# Patient Record
Sex: FEMALE | Race: OTHER | HISPANIC OR LATINO | Employment: FULL TIME | URBAN - METROPOLITAN AREA
[De-identification: names, ages, dates, MRNs, and addresses within clinical notes are randomized per-mention and may not be internally consistent; named-entity substitution may affect disease eponyms.]

---

## 2023-09-10 ENCOUNTER — HOSPITAL ENCOUNTER (OUTPATIENT)
Facility: HOSPITAL | Age: 28
Discharge: HOME | End: 2023-09-11
Attending: EMERGENCY MEDICINE | Admitting: OBSTETRICS & GYNECOLOGY

## 2023-09-10 DIAGNOSIS — R10.9 ABDOMINAL PAIN DURING PREGNANCY IN FIRST TRIMESTER: Primary | ICD-10-CM

## 2023-09-10 DIAGNOSIS — O00.202 ECTOPIC PREGNANCY OF LEFT OVARY: ICD-10-CM

## 2023-09-10 DIAGNOSIS — O26.891 ABDOMINAL PAIN DURING PREGNANCY IN FIRST TRIMESTER: Primary | ICD-10-CM

## 2023-09-10 LAB
ALBUMIN SERPL-MCNC: 4.2 G/DL (ref 3.5–5)
ALBUMIN/GLOB SERPL: 1.1 RATIO (ref 1.1–2)
ALP SERPL-CCNC: 50 UNIT/L (ref 40–150)
ALT SERPL-CCNC: 12 UNIT/L (ref 0–55)
APPEARANCE UR: ABNORMAL
AST SERPL-CCNC: 15 UNIT/L (ref 5–34)
B-HCG FREE SERPL-ACNC: 2627.56 MIU/ML
BACTERIA #/AREA URNS AUTO: ABNORMAL /HPF
BASOPHILS # BLD AUTO: 0.02 X10(3)/MCL
BASOPHILS NFR BLD AUTO: 0.2 %
BILIRUB SERPL-MCNC: 0.5 MG/DL
BILIRUB UR QL STRIP.AUTO: NEGATIVE
BUN SERPL-MCNC: 8.2 MG/DL (ref 7–18.7)
CALCIUM SERPL-MCNC: 9.4 MG/DL (ref 8.4–10.2)
CHLORIDE SERPL-SCNC: 108 MMOL/L (ref 98–107)
CO2 SERPL-SCNC: 20 MMOL/L (ref 22–29)
COLOR UR: ABNORMAL
CREAT SERPL-MCNC: 0.64 MG/DL (ref 0.55–1.02)
EOSINOPHIL # BLD AUTO: 0.17 X10(3)/MCL (ref 0–0.9)
EOSINOPHIL NFR BLD AUTO: 2 %
ERYTHROCYTE [DISTWIDTH] IN BLOOD BY AUTOMATED COUNT: 12.9 % (ref 11.5–17)
GFR SERPLBLD CREATININE-BSD FMLA CKD-EPI: >60 MLS/MIN/1.73/M2
GLOBULIN SER-MCNC: 3.9 GM/DL (ref 2.4–3.5)
GLUCOSE SERPL-MCNC: 101 MG/DL (ref 74–100)
GLUCOSE UR QL STRIP.AUTO: NEGATIVE
GROUP & RH: NORMAL
HCT VFR BLD AUTO: 38.6 % (ref 37–47)
HGB BLD-MCNC: 13.3 G/DL (ref 12–16)
IMM GRANULOCYTES # BLD AUTO: 0.02 X10(3)/MCL (ref 0–0.04)
IMM GRANULOCYTES NFR BLD AUTO: 0.2 %
INDIRECT COOMBS GEL: NORMAL
KETONES UR QL STRIP.AUTO: NEGATIVE
LEUKOCYTE ESTERASE UR QL STRIP.AUTO: ABNORMAL
LIPASE SERPL-CCNC: 20 U/L
LYMPHOCYTES # BLD AUTO: 3.5 X10(3)/MCL (ref 0.6–4.6)
LYMPHOCYTES NFR BLD AUTO: 41.7 %
MCH RBC QN AUTO: 29.9 PG (ref 27–31)
MCHC RBC AUTO-ENTMCNC: 34.5 G/DL (ref 33–36)
MCV RBC AUTO: 86.7 FL (ref 80–94)
MONOCYTES # BLD AUTO: 0.78 X10(3)/MCL (ref 0.1–1.3)
MONOCYTES NFR BLD AUTO: 9.3 %
NEUTROPHILS # BLD AUTO: 3.9 X10(3)/MCL (ref 2.1–9.2)
NEUTROPHILS NFR BLD AUTO: 46.6 %
NITRITE UR QL STRIP.AUTO: NEGATIVE
NRBC BLD AUTO-RTO: 0 %
PH UR STRIP.AUTO: 7 [PH]
PLATELET # BLD AUTO: 303 X10(3)/MCL (ref 130–400)
PMV BLD AUTO: 10.3 FL (ref 7.4–10.4)
POTASSIUM SERPL-SCNC: 3.2 MMOL/L (ref 3.5–5.1)
PROT SERPL-MCNC: 8.1 GM/DL (ref 6.4–8.3)
PROT UR QL STRIP.AUTO: NEGATIVE
RBC # BLD AUTO: 4.45 X10(6)/MCL (ref 4.2–5.4)
RBC #/AREA URNS AUTO: ABNORMAL /HPF
RBC UR QL AUTO: ABNORMAL
SODIUM SERPL-SCNC: 140 MMOL/L (ref 136–145)
SP GR UR STRIP.AUTO: 1.01 (ref 1–1.03)
SPECIMEN OUTDATE: NORMAL
SQUAMOUS #/AREA URNS AUTO: ABNORMAL /HPF
UROBILINOGEN UR STRIP-ACNC: 0.2
WBC # SPEC AUTO: 8.39 X10(3)/MCL (ref 4.5–11.5)
WBC #/AREA URNS AUTO: ABNORMAL /HPF

## 2023-09-10 PROCEDURE — 85025 COMPLETE CBC W/AUTO DIFF WBC: CPT | Performed by: EMERGENCY MEDICINE

## 2023-09-10 PROCEDURE — 63600175 PHARM REV CODE 636 W HCPCS: Performed by: EMERGENCY MEDICINE

## 2023-09-10 PROCEDURE — 83690 ASSAY OF LIPASE: CPT | Performed by: EMERGENCY MEDICINE

## 2023-09-10 PROCEDURE — 87591 N.GONORRHOEAE DNA AMP PROB: CPT | Performed by: EMERGENCY MEDICINE

## 2023-09-10 PROCEDURE — 84702 CHORIONIC GONADOTROPIN TEST: CPT | Performed by: EMERGENCY MEDICINE

## 2023-09-10 PROCEDURE — 96375 TX/PRO/DX INJ NEW DRUG ADDON: CPT

## 2023-09-10 PROCEDURE — 87491 CHLMYD TRACH DNA AMP PROBE: CPT | Performed by: EMERGENCY MEDICINE

## 2023-09-10 PROCEDURE — 80053 COMPREHEN METABOLIC PANEL: CPT | Performed by: EMERGENCY MEDICINE

## 2023-09-10 PROCEDURE — 99285 EMERGENCY DEPT VISIT HI MDM: CPT | Mod: 25

## 2023-09-10 PROCEDURE — 96374 THER/PROPH/DIAG INJ IV PUSH: CPT

## 2023-09-10 PROCEDURE — 81001 URINALYSIS AUTO W/SCOPE: CPT | Performed by: EMERGENCY MEDICINE

## 2023-09-10 PROCEDURE — 86901 BLOOD TYPING SEROLOGIC RH(D): CPT | Performed by: EMERGENCY MEDICINE

## 2023-09-10 PROCEDURE — 25000003 PHARM REV CODE 250: Performed by: EMERGENCY MEDICINE

## 2023-09-10 PROCEDURE — 96361 HYDRATE IV INFUSION ADD-ON: CPT

## 2023-09-10 RX ORDER — ONDANSETRON 2 MG/ML
4 INJECTION INTRAMUSCULAR; INTRAVENOUS
Status: COMPLETED | OUTPATIENT
Start: 2023-09-10 | End: 2023-09-10

## 2023-09-10 RX ORDER — MORPHINE SULFATE 4 MG/ML
4 INJECTION, SOLUTION INTRAMUSCULAR; INTRAVENOUS
Status: COMPLETED | OUTPATIENT
Start: 2023-09-10 | End: 2023-09-10

## 2023-09-10 RX ORDER — SODIUM CHLORIDE 9 MG/ML
1000 INJECTION, SOLUTION INTRAVENOUS
Status: COMPLETED | OUTPATIENT
Start: 2023-09-10 | End: 2023-09-10

## 2023-09-10 RX ADMIN — ONDANSETRON HYDROCHLORIDE 4 MG: 2 SOLUTION INTRAMUSCULAR; INTRAVENOUS at 07:09

## 2023-09-10 RX ADMIN — MORPHINE SULFATE 4 MG: 4 INJECTION, SOLUTION INTRAMUSCULAR; INTRAVENOUS at 07:09

## 2023-09-10 RX ADMIN — SODIUM CHLORIDE 1000 ML: 9 INJECTION, SOLUTION INTRAVENOUS at 07:09

## 2023-09-10 NOTE — Clinical Note
Diagnosis: Abdominal pain during pregnancy in first trimester [[id number]]   Future Attending Provider: GAYLE ECHEVERRIA [[id number]]   Admitting Provider:: GAYLE ECHEVERRIA [[id number]]

## 2023-09-11 VITALS
DIASTOLIC BLOOD PRESSURE: 61 MMHG | TEMPERATURE: 99 F | HEART RATE: 57 BPM | HEIGHT: 63 IN | WEIGHT: 160 LBS | BODY MASS INDEX: 28.35 KG/M2 | SYSTOLIC BLOOD PRESSURE: 100 MMHG | OXYGEN SATURATION: 99 % | RESPIRATION RATE: 18 BRPM

## 2023-09-11 PROBLEM — O00.102 MISCARRIAGE OF LEFT TUBAL ECTOPIC PREGNANCY: Status: ACTIVE | Noted: 2023-09-11

## 2023-09-11 PROBLEM — O03.9 MISCARRIAGE OF LEFT TUBAL ECTOPIC PREGNANCY: Status: ACTIVE | Noted: 2023-09-11

## 2023-09-11 LAB
C TRACH DNA SPEC QL NAA+PROBE: NOT DETECTED
N GONORRHOEA DNA SPEC QL NAA+PROBE: NOT DETECTED
SOURCE (OHS): NORMAL

## 2023-09-11 PROCEDURE — 63600175 PHARM REV CODE 636 W HCPCS: Performed by: OBSTETRICS & GYNECOLOGY

## 2023-09-11 PROCEDURE — 96401 CHEMO ANTI-NEOPL SQ/IM: CPT | Performed by: OBSTETRICS & GYNECOLOGY

## 2023-09-11 PROCEDURE — G0378 HOSPITAL OBSERVATION PER HR: HCPCS

## 2023-09-11 RX ORDER — NAPROXEN 500 MG/1
500 TABLET ORAL EVERY 8 HOURS PRN
Status: DISCONTINUED | OUTPATIENT
Start: 2023-09-11 | End: 2023-09-11 | Stop reason: HOSPADM

## 2023-09-11 RX ORDER — ONDANSETRON 4 MG/1
8 TABLET, ORALLY DISINTEGRATING ORAL EVERY 8 HOURS PRN
Status: DISCONTINUED | OUTPATIENT
Start: 2023-09-11 | End: 2023-09-11 | Stop reason: HOSPADM

## 2023-09-11 RX ORDER — PROCHLORPERAZINE EDISYLATE 5 MG/ML
5 INJECTION INTRAMUSCULAR; INTRAVENOUS EVERY 6 HOURS PRN
Status: DISCONTINUED | OUTPATIENT
Start: 2023-09-11 | End: 2023-09-11 | Stop reason: HOSPADM

## 2023-09-11 RX ORDER — OXYCODONE AND ACETAMINOPHEN 5; 325 MG/1; MG/1
1 TABLET ORAL EVERY 4 HOURS PRN
Status: DISCONTINUED | OUTPATIENT
Start: 2023-09-11 | End: 2023-09-11 | Stop reason: HOSPADM

## 2023-09-11 RX ORDER — METHOTREXATE 25 MG/ML
50 INJECTION INTRA-ARTERIAL; INTRAMUSCULAR; INTRATHECAL; INTRAVENOUS ONCE
Status: COMPLETED | OUTPATIENT
Start: 2023-09-11 | End: 2023-09-11

## 2023-09-11 RX ORDER — NAPROXEN 500 MG/1
500 TABLET ORAL EVERY 8 HOURS PRN
Qty: 30 TABLET | Refills: 0 | Status: SHIPPED | OUTPATIENT
Start: 2023-09-11

## 2023-09-11 RX ADMIN — METHOTREXATE 90 MG: 25 SOLUTION INTRA-ARTERIAL; INTRAMUSCULAR; INTRATHECAL; INTRAVENOUS at 03:09

## 2023-09-11 NOTE — CONSULTS
Received consult regarding assistance with Medicaid application, spoke with Dorinda with [facility] who reports that patient is on her list and she will be in patient's rm shortly to assist with application. Updated GILMA Silvestre.

## 2023-09-11 NOTE — DISCHARGE SUMMARY
[facility] - 2nd Floor Mother/Baby Unit  Discharge Summary  Gynecology      Admit Date: 9/10/2023    Discharge Date and Time:  09/11/2023 1:16 PM    Attending Physician: Romario Peterson MD     Discharge Provider: Renata Gomes    Reason for Admission: Suspected ectopic pregnancy    Procedures Performed: * No surgery found *    Hospital Course (synopsis of major diagnoses, care, treatment, and services provided during the course of the hospital stay): 27 yo G [id number] who presented to the ED with acute LLQ pain. Beta-hcg found to be 2,627.56. TVUS revealed no IUP with peripheral echogenic left adnexal lesion concerning for ectopic pregnancy. Patient was counseled on options and received methotrexate for medical management. She was seen by social work for assistance in setting up financial coverage to be seen for follow up in [facility]. She was found to be meeting milestones and stable for discharge on HD#2.     Consults: social work    Significant Diagnostic Studies: Radiology: Ultrasound: No intrauterine pregnancy identified.  A peripherally echogenic left adnexal lesion measures up to 22 mm and could be early ectopic pregnancy.    Final Diagnoses:   Principal Problem: Left adnexal ectopic pregnancy    Discharged Condition: good    Disposition: Home or Self Care    Follow Up/Patient Instructions:     Medications:  Reconciled Home Medications:      Medication List        START taking these medications      naproxen 500 MG tablet  Commonly known as: NAPROSYN  Take 1 tablet (500 mg total) by mouth every 8 (eight) hours as needed (cramping/pain scale 1-3).            Discharge Procedure Orders   HCG, Quantitative   Standing Status: Future Standing Exp. Date: 11/09/24     Order Specific Question Answer Comments   Release to patient Immediate      HCG, Quantitative   Standing Status: Future Standing Exp. Date: 11/09/24   Order Comments:       Order Specific Question Answer Comments   Release to patient  Immediate      Diet Adult Regular     Notify your health care provider if you experience any of the following:  temperature >100.4     Notify your health care provider if you experience any of the following:  persistent nausea and vomiting or diarrhea     Notify your health care provider if you experience any of the following:  severe uncontrolled pain     Notify your health care provider if you experience any of the following:  difficulty breathing or increased cough     Notify your health care provider if you experience any of the following:   Order Comments: Mucho sangrado vaginal.     Activity as tolerated      Follow-up Information       [facility] - ED Follow up on 9/14/2023.    Why: Follow up on day 4 - 9/14/23 @ [facility] 2nd floor outpatient labs.   Follow up on day 7- 9/17/23 @ [facility] ER entrance.     For follow up on lab work after methotrexate administration.  Contact information:  [address] [facility] [id number]                         Renata Gomes DO  [facility] PGY-2

## 2023-09-11 NOTE — SUBJECTIVE & OBJECTIVE
OB History    Para Term  AB Living   3 2 0 0 0 0   SAB IAB Ectopic Multiple Live Births   0 0 0 0 0      # Outcome Date GA Lbr Anupam/2nd Weight Sex Delivery Anes PTL Lv   3             2 Para            1 Para              History reviewed. No pertinent past medical history.  History reviewed. No pertinent surgical history.    (Not in a hospital admission)      Review of patient's allergies indicates:  No Known Allergies     Family History    None       Tobacco Use    Smoking status: Not on file    Smokeless tobacco: Not on file   Substance and Sexual Activity    Alcohol use: Not on file    Drug use: Not on file    Sexual activity: Not on file     Review of Systems   Constitutional: Negative.    Respiratory: Negative.     Cardiovascular: Negative.    Gastrointestinal: Negative.    Endocrine: Negative.    Genitourinary: Negative.    Musculoskeletal: Negative.    Neurological: Negative.    Hematological: Negative.    Psychiatric/Behavioral: Negative.     Breast: negative.       Objective:     Vital Signs (Most Recent):  Temp: 98.4 °F (36.9 °C) (09/10/23 234)  Pulse: 82 (09/10/23 234)  Resp: 16 (09/10/23 2344)  BP: 113/73 (09/10/23 234)  SpO2: 99 % (09/10/23 2344) Vital Signs (24h Range):  Temp:  [98.2 °F (36.8 °C)-98.4 °F (36.9 °C)] 98.4 °F (36.9 °C)  Pulse:  [] 82  Resp:  [16-22] 16  SpO2:  [99 %-100 %] 99 %  BP: (113-150)/(59-82) 113/73     Weight: 72.6 kg (160 lb)  Body mass index is 28.34 kg/m².    No LMP recorded.     Physical Exam:   Constitutional: She is oriented to person, place, and time. She appears well-developed and well-nourished. No distress.    HENT:   Head: Normocephalic and atraumatic.     Neck: No thyromegaly present.     Pulmonary/Chest: Effort normal. No respiratory distress.        Abdominal: Soft. She exhibits no distension and no mass. There is abdominal tenderness (LLQ). There is guarding (LLQ). There is no rebound.             Musculoskeletal: Normal range of  "motion and moves all extremeties. No tenderness.       Neurological: She is alert and oriented to person, place, and time. No cranial nerve deficit. Coordination normal.    Skin: She is not diaphoretic.    Psychiatric: She has a normal mood and affect. Her behavior is normal. Judgment and thought content normal.        Laboratory:  Beta HCG: No results for input(s): "HCGPREGUR" in the last 48 hours.  CBC:   Recent Labs   Lab 09/10/23  1933   WBC 8.39   RBC 4.45   HGB 13.3   HCT 38.6      MCV 86.7   MCH 29.9   MCHC 34.5     CMP:   Recent Labs   Lab 09/10/23  1933   CALCIUM 9.4   ALBUMIN 4.2      K 3.2*   CO2 20*   BUN 8.2   CREATININE 0.64   ALKPHOS 50   ALT 12   AST 15   BILITOT 0.5       Diagnostic Results:  Labs: Reviewed  US: Reviewed  "

## 2023-09-11 NOTE — HPI
28 yr  with LMP approx 5-6 wks ago presents to the ER with acute LLQ pain.  Pt denies any health probs.  Prior pregnancies were normal vaginal deliveries with no prenatal complications.    History reviewed. No pertinent past medical history.    History reviewed. No pertinent surgical history.    Review of patient's allergies indicates:  No Known Allergies

## 2023-09-11 NOTE — NURSING
Discharge instructions reviewed with patient at bedside with  line. Order sheet for lab work given for 9/15 and 9/18.Instructed patient to go to [facility] for blood draws.

## 2023-09-11 NOTE — ED PROVIDER NOTES
Encounter Date: 9/10/2023    SCRIBE #1 NOTE: I, Tuan Hernandez, am scribing for, and in the presence of,  Yudelka Mar MD. I have scribed the following portions of the note - Other sections scribed: HPI,ROS,PE.       History     Chief Complaint   Patient presents with    Abdominal Pain     States she is about 2wks pregnant and began to have vaginal bleeding today and severe lower abd pain.      29 y/o 2 week pregnant female with no PMHx presents to ED via EMS as a transfer from [facility] for lower abdominal pain onset 9/10. Pt reports the pain started in the morning and had a lot of spotting.  She denies any current spotting.     The history is provided by the patient. The history is limited by a language barrier. A  was used.     Review of patient's allergies indicates:  No Known Allergies  History reviewed. No pertinent past medical history.  History reviewed. No pertinent surgical history.  No family history on file.     Review of Systems   Constitutional:  Negative for fatigue, fever and unexpected weight change.   HENT:  Negative for congestion and rhinorrhea.    Eyes:  Negative for pain.   Respiratory:  Negative for chest tightness, shortness of breath and wheezing.    Cardiovascular:  Negative for chest pain.   Gastrointestinal:  Positive for abdominal pain (lower). Negative for constipation, diarrhea, nausea and vomiting.   Genitourinary:  Positive for vaginal bleeding. Negative for dysuria.   Musculoskeletal:  Negative for back pain and neck pain.   Skin:  Negative for rash.   Allergic/Immunologic: Negative for environmental allergies, food allergies and immunocompromised state.   Neurological:  Negative for dizziness and speech difficulty.   Hematological:  Does not bruise/bleed easily.   Psychiatric/Behavioral:  Negative for sleep disturbance and suicidal ideas.        Physical Exam     Initial Vitals [09/10/23 1920]   BP Pulse Resp Temp SpO2   (!) 150/82 106 (!) 22 98.3 °F (36.8 °C)  100 %      MAP       --         Physical Exam    Nursing note and vitals reviewed.  Constitutional: She appears well-developed and well-nourished. No distress.   HENT:   Head: Normocephalic and atraumatic.   Nose: Nose normal.   Mouth/Throat: Oropharynx is clear and moist.   Eyes: Conjunctivae and EOM are normal. Pupils are equal, round, and reactive to light.   Neck: Trachea normal. Neck supple.   Normal range of motion.  Cardiovascular:  Normal rate and regular rhythm.           No murmur heard.  Pulmonary/Chest: Breath sounds normal. No respiratory distress. She exhibits no tenderness.   Abdominal: Abdomen is soft. Bowel sounds are normal. There is abdominal tenderness (lower abdominal tenderness). There is guarding.   Musculoskeletal:         General: Normal range of motion.      Cervical back: Normal range of motion and neck supple.      Lumbar back: Normal. Normal range of motion.     Neurological: She is alert and oriented to person, place, and time. She has normal strength. No cranial nerve deficit or sensory deficit.   Skin: Skin is warm and dry. Capillary refill takes less than 2 seconds. No rash and no abscess noted. No erythema. No pallor.   Psychiatric: She has a normal mood and affect. Her behavior is normal. Judgment and thought content normal.         ED Course   Procedures  Labs Reviewed   COMPREHENSIVE METABOLIC PANEL - Abnormal; Notable for the following components:       Result Value    Potassium Level 3.2 (*)     Chloride 108 (*)     Carbon Dioxide 20 (*)     Glucose Level 101 (*)     Globulin 3.9 (*)     All other components within normal limits   URINALYSIS, REFLEX TO URINE CULTURE - Abnormal; Notable for the following components:    Appearance, UA SL CLOUDY (*)     Blood, UA Moderate (*)     Leukocyte Esterase, UA Trace (*)     All other components within normal limits   HCG, QUANTITATIVE - Abnormal; Notable for the following components:    Beta Human Chorionic Gonadotropin Quantitative  2,627.56 (*)     All other components within normal limits   URINALYSIS, MICROSCOPIC - Abnormal; Notable for the following components:    Bacteria, UA Few (*)     Squamous Epithelial Cells, UA Few (*)     All other components within normal limits   LIPASE - Normal   CHLAMYDIA/GONORRHOEAE(GC), PCR   CBC W/ AUTO DIFFERENTIAL    Narrative:     The following orders were created for panel order CBC W/ AUTO DIFFERENTIAL.  Procedure                               Abnormality         Status                     ---------                               -----------         ------                     CBC with Differential[[id number]]                           Final result                 Please view results for these tests on the individual orders.   CBC WITH DIFFERENTIAL   TYPE & SCREEN          Imaging Results              US OB <14 Wks TransAbd & TransVag, Single Gestation (XPD) (Preliminary result)  Result time 09/10/23 22:17:02      Preliminary result by Carlos Fraser MD (09/10/23 22:17:02)                   Narrative:    START OF REPORT:  Technique: First trimester ultrasound of the pelvis was performed with transabdominal and transvaginal images being obtained.    Comparison: None.    Clinical history: LMP 2023. Beta HGG of 2627.56 MIU/ML. Abdominal pain and bleeding in pregnancy. .    FINDINGS:  Uterus: The uterus measures 9.3 cm in sagittal dimension by 6.5 cm in transverse dimension by 5.3 cm in AP dimension. The endometrium is thickened and measures 1.4 cm in thickness. There is a trace amount of fluid in the endometrium.  Intrauterine gestation: No intrauterine gestation is identified.    Adnexa:  Right Ovary: The right ovary measures 1.6 cm by 1.4 cm by 1.0 cm.  Left Ovary: The left ovary measures 2.6 cm by 1.9 cm by 2.1 cm. There is an apparent mass adjacent to the left ovary measuring 2.2 x 1.6 cm with surrounding free fluid and echogenic material. The possibility of an ectopic pregnancy is  entertained.    Notification: The results were discussed with the emergency room physician (Dr De La Cruz) prior to dictation at 2023-09-10 22:54:29 CDT.      Impression:  1. No intrauterine gestation is identified.  2. There is an apparent mass adjacent to the left ovary measuring 2.2 x 1.6 cm with surrounding free fluid and echogenic material. The possibility of an ectopic pregnancy is entertained. Correlate with clinical findings as regards further evaluation and follow-up.  3. Recommend short term serial clinical laboratory and ultrasound followup.                                         Medications   methotrexate (PF) injection 90 mg (has no administration in time range)   ondansetron disintegrating tablet 8 mg (has no administration in time range)   prochlorperazine injection Soln 5 mg (has no administration in time range)   oxyCODONE-acetaminophen 5-325 mg per tablet 1 tablet (has no administration in time range)   naproxen tablet 500 mg (has no administration in time range)   morphine injection 4 mg (4 mg Intravenous Given 9/10/23 1953)   ondansetron injection 4 mg (4 mg Intravenous Given 9/10/23 1952)   0.9%  NaCl infusion (0 mLs Intravenous Stopped 9/10/23 2100)             Scribe Attestation:   Scribe #1: I performed the above scribed service and the documentation accurately describes the services I performed. I attest to the accuracy of the note.  Comments: Attending:   Physician Attestation Statement for Scribe #1: Yudelka ESTEVEZ MD, personally performed the services described in this documentation. All medical record entries made by the scribe were at my direction and in my presence.  I have reviewed the chart and agree that the record reflects my personal performance and is accurate and complete.        Attending Attestation:           Physician Attestation for Scribe:  Physician Attestation Statement for Scribe #1: Zaid ESTEVEZ Brooke R, MD, reviewed documentation, as scribed by Tuan Hernandez in my  presence, and it is both accurate and complete.         Medical Decision Making  The differential diagnosis includes, but is not limited to, ruptured ectopic. Contained ectopic  Discussed case with obgyn hospitalist who took over care of patient and recommended methotrexate therapy, will need f/u labs in 4 days per his report     Problems Addressed:  Abdominal pain during pregnancy in first trimester: acute illness or injury that poses a threat to life or bodily functions  Ectopic pregnancy of left ovary: acute illness or injury that poses a threat to life or bodily functions    Amount and/or Complexity of Data Reviewed  External Data Reviewed: labs, radiology and notes.  Labs:  Decision-making details documented in ED Course.     Details: Reviewed labs from sending facility  Radiology:  Decision-making details documented in ED Course.     Details: Reviewed us from sending facility    Risk  OTC drugs.  Prescription drug management.  Decision regarding hospitalization.           ED Course as of 09/11/23 0110   Sun Sep 10, 2023   2157 Notified by [email or web address], suspicious for ectopic pregnancy with mass in LLQ, no IUP, and some small free fluid on TV US. Awaiting to get images sent over to review.  [GM]   2216 Attemping to call [facility] to confirm. However both numbers [phone] as well as [phone] appear to be non functional.  [GM]   2231 Patient informed of results. Pain mild currently . , /80 systolic, oxy sats 100% [GM]   2247 Spoke to the OB on call, Dr. Arias. Recommends ED to ED transfer.   Spoke with Dr. Manjarrez. Accepted to Located within [facility] ED. [GM]   9219 Paged OBGYN [JESU]   2304 Call and consult with OBGYN  [JESU]   1925 Spoke with dr lovelace will come down to see pt [BS]      ED Course User Index  [BS] Yudelka Mar MD  [GM] Zainab De La Cruz MD  [JESU] Tuan Hernandez               Medical Decision Making:   History:   Old Medical Records: I decided to obtain old medical records.  Old Records  Summarized: records from another hospital.  Initial Assessment:   See hpi  Clinical Tests:   Lab Tests: Reviewed  Radiological Study: Reviewed  Other:   I have discussed this case with another health care provider.      Clinical Impression:   Final diagnoses:  [O26.891, R10.9] Abdominal pain during pregnancy in first trimester (Primary)  [O00.202] Ectopic pregnancy of left ovary        ED Disposition Condition    Send to KRISTEN&Yudelka Baker MD  09/11/23 0110

## 2023-09-11 NOTE — H&P
[facility] - Emergency Dept  Obstetrics & Gynecology  History & Physical    Patient Name: Svetlana Akhtar  MRN: [id number]  Admission Date: 9/10/2023  Primary Care Provider: No primary care provider on file.    Subjective:     Chief Complaint/Reason for Admission:   Ectopic pregnancy in L adnexa    History of Present Illness:  28 yr  with LMP approx 5-6 wks ago presents to the ER with acute LLQ pain.  Pt denies any health probs.  Prior pregnancies were normal vaginal deliveries with no prenatal complications.    History reviewed. No pertinent past medical history.    History reviewed. No pertinent surgical history.    Review of patient's allergies indicates:  No Known Allergies            OB History    Para Term  AB Living   3 2 0 0 0 0   SAB IAB Ectopic Multiple Live Births   0 0 0 0 0      # Outcome Date GA Lbr Anupam/2nd Weight Sex Delivery Anes PTL Lv   3             2 Para            1 Para              History reviewed. No pertinent past medical history.  History reviewed. No pertinent surgical history.    (Not in a hospital admission)      Review of patient's allergies indicates:  No Known Allergies     Family History    None       Tobacco Use    Smoking status: Not on file    Smokeless tobacco: Not on file   Substance and Sexual Activity    Alcohol use: Not on file    Drug use: Not on file    Sexual activity: Not on file     Review of Systems   Constitutional: Negative.    Respiratory: Negative.     Cardiovascular: Negative.    Gastrointestinal: Negative.    Endocrine: Negative.    Genitourinary: Negative.    Musculoskeletal: Negative.    Neurological: Negative.    Hematological: Negative.    Psychiatric/Behavioral: Negative.     Breast: negative.       Objective:     Vital Signs (Most Recent):  Temp: 98.4 °F (36.9 °C) (09/10/23 2344)  Pulse: 82 (09/10/23 2344)  Resp: 16 (09/10/23 2344)  BP: 113/73 (09/10/23 2344)  SpO2: 99 % (09/10/23 2344) Vital Signs (24h  "Range):  Temp:  [98.2 °F (36.8 °C)-98.4 °F (36.9 °C)] 98.4 °F (36.9 °C)  Pulse:  [] 82  Resp:  [16-22] 16  SpO2:  [99 %-100 %] 99 %  BP: (113-150)/(59-82) 113/73     Weight: 72.6 kg (160 lb)  Body mass index is 28.34 kg/m².    No LMP recorded.     Physical Exam:   Constitutional: She is oriented to person, place, and time. She appears well-developed and well-nourished. No distress.    HENT:   Head: Normocephalic and atraumatic.     Neck: No thyromegaly present.     Pulmonary/Chest: Effort normal. No respiratory distress.        Abdominal: Soft. She exhibits no distension and no mass. There is abdominal tenderness (LLQ). There is guarding (LLQ). There is no rebound.             Musculoskeletal: Normal range of motion and moves all extremeties. No tenderness.       Neurological: She is alert and oriented to person, place, and time. No cranial nerve deficit. Coordination normal.    Skin: She is not diaphoretic.    Psychiatric: She has a normal mood and affect. Her behavior is normal. Judgment and thought content normal.        Laboratory:  Beta HCG: No results for input(s): "HCGPREGUR" in the last 48 hours.  CBC:   Recent Labs   Lab 09/10/23  1933   WBC 8.39   RBC 4.45   HGB 13.3   HCT 38.6      MCV 86.7   MCH 29.9   MCHC 34.5     CMP:   Recent Labs   Lab 09/10/23  1933   CALCIUM 9.4   ALBUMIN 4.2      K 3.2*   CO2 20*   BUN 8.2   CREATININE 0.64   ALKPHOS 50   ALT 12   AST 15   BILITOT 0.5       Diagnostic Results:  Labs: Reviewed  US: Reviewed    Assessment/Plan:     Obstetric  * Miscarriage of left tubal ectopic pregnancy  Discussed medical management with MTX vs immediate salpingectomy.  Language line interpretor used.  Pt states that she would be able to do the follow up required for medical management.  Consents signed, and all questions answered.  MTX 50 mg/m^2 x 1 dose.  Repeat HCT in 4 days and 7 days.  F/u [facility] clinic next week.  Ectopic rupture precautions given and pt will f/u " immediately in ER if pain worsens        Romario Peterson MD  Obstetrics & Gynecology  [facility] - Emergency Dept

## 2023-09-11 NOTE — HOSPITAL COURSE
HCG quant = 2627.  Pelvic sono suggestive of ectopic in L adnexa.  Small amount of free fluid was noted.  A+  Hgb: 13.3    Pelvic sono:  Uterus: The uterus measures 9.3 cm in sagittal dimension by 6.5 cm in transverse dimension by 5.3 cm in AP dimension. The endometrium is thickened and measures 1.4 cm in thickness. There is a trace amount of fluid in the endometrium.  Intrauterine gestation: No intrauterine gestation is identified.     Adnexa:  Right Ovary: The right ovary measures 1.6 cm by 1.4 cm by 1.0 cm.  Left Ovary: The left ovary measures 2.6 cm by 1.9 cm by 2.1 cm. There is an apparent mass adjacent to the left ovary measuring 2.2 x 1.6 cm with surrounding free fluid and echogenic material. The possibility of an ectopic pregnancy is entertained.     Notification: The results were discussed with the emergency room physician (Dr De La Cruz) prior to dictation at 2023-09-10 22:54:29 CDT.        Impression:  1. No intrauterine gestation is identified.  2. There is an apparent mass adjacent to the left ovary measuring 2.2 x 1.6 cm with surrounding free fluid and echogenic material. The possibility of an ectopic pregnancy is entertained. Correlate with clinical findings as regards further evaluation and follow-up.  3. Recommend short term serial clinical laboratory and ultrasound followup

## 2023-09-11 NOTE — ASSESSMENT & PLAN NOTE
Discussed medical management with MTX vs immediate salpingectomy.  Language line interpretor used.  Pt states that she would be able to do the follow up required for medical management.  Consents signed, and all questions answered.  MTX 50 mg/m^2 x 1 dose.  Repeat HCT in 4 days and 7 days.  F/u [facility] clinic next week.  Ectopic rupture precautions given and pt will f/u immediately in ER if pain worsens

## 2023-09-11 NOTE — ED PROVIDER NOTES
Encounter Date: 9/10/2023       History     Chief Complaint   Patient presents with    Abdominal Pain     States she is about 2wks pregnant and began to have vaginal bleeding today and severe lower abd pain.      Patient is a 28  F presenting with left lower abdominal and supra pubic pain that started this morning. She reports she recently had a positive pregnancy test but thinks she is only about 4-6 weeks along with last LMP in July. She also reports a large amount of bleeding this morning but now she is only having spotting. Last night she was in her normal state of health. No fevers, chills, chest pain, urinary symptoms, or abdominal pain then. Currently pain is severe.      line used.         Review of patient's allergies indicates:  No Known Allergies  History reviewed. No pertinent past medical history.  History reviewed. No pertinent surgical history.  No family history on file.     Review of Systems   Constitutional:  Negative for fever.   HENT:  Negative for sore throat.    Respiratory:  Negative for shortness of breath.    Cardiovascular:  Negative for chest pain.   Gastrointestinal:  Positive for abdominal pain. Negative for nausea.   Genitourinary:  Positive for vaginal bleeding. Negative for dysuria.   Musculoskeletal:  Negative for back pain.   Skin:  Negative for rash.   Neurological:  Negative for weakness.   Hematological:  Does not bruise/bleed easily.       Physical Exam     Initial Vitals [09/10/23 1920]   BP Pulse Resp Temp SpO2   (!) 150/82 106 (!) 22 98.3 °F (36.8 °C) 100 %      MAP       --         Physical Exam    Nursing note and vitals reviewed.  Constitutional: She appears well-developed and well-nourished. No distress.   HENT:   Head: Normocephalic and atraumatic.   Eyes: Conjunctivae are normal.   Neck: Neck supple.   Cardiovascular:  Normal rate, regular rhythm and normal heart sounds.           No murmur heard.  Pulmonary/Chest: Breath sounds normal. No respiratory  distress. She has no wheezes. She has no rhonchi.   Abdominal: Abdomen is soft. Bowel sounds are normal. She exhibits no distension. There is abdominal tenderness.   Diffusely tender to palpation of the abdomen with some mild voluntary guarding. There is guarding. There is no rebound.   Musculoskeletal:         General: No edema. Normal range of motion.      Cervical back: Neck supple.     Neurological: She is alert and oriented to person, place, and time.   Skin: Skin is warm and dry.   Psychiatric: She has a normal mood and affect. Thought content normal.         ED Course   ED US FAST    Date/Time: 9/10/2023 7:34 PM    Performed by: Zainab De La Cruz MD  Authorized by: Zainab De La Cruz MD    Indication:  Other (Abdominal pain, tachycardia, with suspected pregnancy)  Identified Structures: Morrisons, splenorenal recess, and suprapubic/culdesac were visualized.  The following findings in the peritoneal, pericardial, and pleural spaces were obtained:     Hepatorenal free fluid:  Absent    Splenorenal free fluid:  Absent    Suprapubic/Pouch of Ismael free fluid:  Absent    Impression:  No pathologic free fluid    Labs Reviewed   COMPREHENSIVE METABOLIC PANEL - Abnormal; Notable for the following components:       Result Value    Potassium Level 3.2 (*)     Chloride 108 (*)     Carbon Dioxide 20 (*)     Glucose Level 101 (*)     Globulin 3.9 (*)     All other components within normal limits   URINALYSIS, REFLEX TO URINE CULTURE - Abnormal; Notable for the following components:    Appearance, UA SL CLOUDY (*)     Blood, UA Moderate (*)     Leukocyte Esterase, UA Trace (*)     All other components within normal limits   HCG, QUANTITATIVE - Abnormal; Notable for the following components:    Beta Human Chorionic Gonadotropin Quantitative 2,627.56 (*)     All other components within normal limits   URINALYSIS, MICROSCOPIC - Abnormal; Notable for the following components:    Bacteria, UA Few (*)     Squamous Epithelial  Cells, UA Few (*)     All other components within normal limits   LIPASE - Normal   CHLAMYDIA/GONORRHOEAE(GC), PCR   CBC W/ AUTO DIFFERENTIAL    Narrative:     The following orders were created for panel order CBC W/ AUTO DIFFERENTIAL.  Procedure                               Abnormality         Status                     ---------                               -----------         ------                     CBC with Differential[[id number]]                           Final result                 Please view results for these tests on the individual orders.   CBC WITH DIFFERENTIAL   TYPE & SCREEN          Imaging Results              US OB <14 Wks TransAbd & TransVag, Single Gestation (XPD) (Preliminary result)  Result time 09/10/23 22:17:02      Preliminary result by Carlos Fraser MD (09/10/23 22:17:02)                   Narrative:    START OF REPORT:  Technique: First trimester ultrasound of the pelvis was performed with transabdominal and transvaginal images being obtained.    Comparison: None.    Clinical history: LMP 2023. Beta HGG of 2627.56 MIU/ML. Abdominal pain and bleeding in pregnancy. .    FINDINGS:  Uterus: The uterus measures 9.3 cm in sagittal dimension by 6.5 cm in transverse dimension by 5.3 cm in AP dimension. The endometrium is thickened and measures 1.4 cm in thickness. There is a trace amount of fluid in the endometrium.  Intrauterine gestation: No intrauterine gestation is identified.    Adnexa:  Right Ovary: The right ovary measures 1.6 cm by 1.4 cm by 1.0 cm.  Left Ovary: The left ovary measures 2.6 cm by 1.9 cm by 2.1 cm. There is an apparent mass adjacent to the left ovary measuring 2.2 x 1.6 cm with surrounding free fluid and echogenic material. The possibility of an ectopic pregnancy is entertained.    Notification: The results were discussed with the emergency room physician (Dr De La Cruz) prior to dictation at 2023-09-10 22:54:29 CDT.      Impression:  1. No intrauterine  gestation is identified.  2. There is an apparent mass adjacent to the left ovary measuring 2.2 x 1.6 cm with surrounding free fluid and echogenic material. The possibility of an ectopic pregnancy is entertained. Correlate with clinical findings as regards further evaluation and follow-up.  3. Recommend short term serial clinical laboratory and ultrasound followup.                                         Medications   morphine injection 4 mg (4 mg Intravenous Given 9/10/23 1953)   ondansetron injection 4 mg (4 mg Intravenous Given 9/10/23 1952)   0.9%  NaCl infusion (0 mLs Intravenous Stopped 9/10/23 2100)     Medical Decision Making  Patient is a 27 yo F presenting with LLQ abdominal pain. Differentials considered but not limited to ovarian cyst, ovarian torsion, ectopic pregnancy, normal pain in early pregnancy, UTI, kidney stone, and diverticulitis. Patient's pregnancy test positive at home. Bedside Fast on arrival showed no FF. Quant is above discriminatory threshold. Consultative US shows free fluid and mass in LLQ near ovary suspicious for ectopic. Patient stable, pain controlled. Consulted OB and will transfer to [facility] ED for definitive care. Patient informed of plan using  line.       Problems Addressed:  Abdominal pain during pregnancy in first trimester: acute illness or injury  Ectopic pregnancy of left ovary: acute illness or injury    Amount and/or Complexity of Data Reviewed  Labs: ordered. Decision-making details documented in ED Course.  Radiology: ordered and independent interpretation performed. Decision-making details documented in ED Course.  Discussion of management or test interpretation with external provider(s): Case discussed with OB and accepted [facility] ED MD.     Critical Care  Total time providing critical care: 42 minutes               ED Course as of 09/10/23 2308   Sun Sep 10, 2023   2157 Notified by [employer], suspicious for ectopic pregnancy with mass in LLQ, no IUP, and some  small free fluid on TV US. Awaiting to get images sent over to review.  [GM]   2217 [place] to call [facility] to confirm. However both numbers [phone] as well as [phone] appear to be non functional.  [GM]   2230 Patient informed of results. Pain mild currently . , /80 systolic, oxy sats 100% [GM]   224 Spoke to the OB on call, Dr. Arias. Recommends ED to ED transfer.   Spoke with Dr. Manjarrez. Accepted to [facility] ED. [GM]      ED Course User Index  [GM] Zainab De La Cruz MD                    Clinical Impression:   Final diagnoses:  [O26.891, R10.9] Abdominal pain during pregnancy in first trimester (Primary)  [O00.202] Ectopic pregnancy of left ovary        ED Disposition Condition    Transfer to Another Facility Stable                Zainab De La Cruz MD  09/10/23 5825       Zainab De La Cruz MD  09/10/23 5272

## 2023-09-14 ENCOUNTER — LAB VISIT (OUTPATIENT)
Dept: LAB | Facility: HOSPITAL | Age: 28
End: 2023-09-14
Attending: OBSTETRICS & GYNECOLOGY

## 2023-09-14 ENCOUNTER — TELEPHONE (OUTPATIENT)
Dept: GYNECOLOGY | Facility: CLINIC | Age: 28
End: 2023-09-14

## 2023-09-14 DIAGNOSIS — O00.202 ECTOPIC PREGNANCY OF LEFT OVARY: ICD-10-CM

## 2023-09-14 DIAGNOSIS — R10.9 ABDOMINAL PAIN DURING PREGNANCY IN FIRST TRIMESTER: ICD-10-CM

## 2023-09-14 DIAGNOSIS — O26.891 ABDOMINAL PAIN DURING PREGNANCY IN FIRST TRIMESTER: ICD-10-CM

## 2023-09-14 LAB — B-HCG FREE SERPL-ACNC: 3820.94 MIU/ML

## 2023-09-14 PROCEDURE — 84702 CHORIONIC GONADOTROPIN TEST: CPT

## 2023-09-14 PROCEDURE — 36415 COLL VENOUS BLD VENIPUNCTURE: CPT

## 2023-09-14 NOTE — TELEPHONE ENCOUNTER
Called patient with instructions to go to lab for day 4 HCG. Patient voiced understanding. Order in.

## 2023-09-18 ENCOUNTER — LAB VISIT (OUTPATIENT)
Dept: LAB | Facility: HOSPITAL | Age: 28
End: 2023-09-18
Attending: OBSTETRICS & GYNECOLOGY

## 2023-09-18 DIAGNOSIS — O03.9 MISCARRIAGE OF LEFT TUBAL ECTOPIC PREGNANCY: ICD-10-CM

## 2023-09-18 DIAGNOSIS — R10.9 ABDOMINAL PAIN DURING PREGNANCY IN FIRST TRIMESTER: ICD-10-CM

## 2023-09-18 DIAGNOSIS — O26.891 ABDOMINAL PAIN DURING PREGNANCY IN FIRST TRIMESTER: ICD-10-CM

## 2023-09-18 DIAGNOSIS — O00.202 ECTOPIC PREGNANCY OF LEFT OVARY: ICD-10-CM

## 2023-09-18 DIAGNOSIS — O00.102 MISCARRIAGE OF LEFT TUBAL ECTOPIC PREGNANCY: ICD-10-CM

## 2023-09-18 LAB — B-HCG FREE SERPL-ACNC: 2773.98 MIU/ML

## 2023-09-18 PROCEDURE — 36415 COLL VENOUS BLD VENIPUNCTURE: CPT

## 2023-09-18 PROCEDURE — 84702 CHORIONIC GONADOTROPIN TEST: CPT

## 2023-09-18 NOTE — PROGRESS NOTES
Discussed with patient appropriate drop in HCG after MTX. Patient will need weekly quants. Made aware and order placed.     HCG trend   2627 -MTX-> D4 3820 -> D7 2773    Of note, patient not currently on contraception. Desires IUD. Discussed that she can come to [facility] clinic for placement, however given scheduling booking, may take a few months. Patient amenable for depo provera in the meantime. Will schedule

## 2023-09-19 ENCOUNTER — TELEPHONE (OUTPATIENT)
Dept: GYNECOLOGY | Facility: CLINIC | Age: 28
End: 2023-09-19

## 2023-09-19 NOTE — TELEPHONE ENCOUNTER
Her appointment for Depo-Provera and IUD insertion has been made.   I called her using a  to inform her of both appointments.            ----- Message from Michelle Starkey MD sent at 9/18/2023 11:49 AM CDT -----  Regarding: Depo appointment and IUD appointment  Hi    This patient will need an appointment for IUD insertion for contraception. I know we're booked out so please also make a depo injection shot appointment for nursing visit ASAP. IUD insertion can be scheduled in 3 months

## 2023-09-21 ENCOUNTER — CLINICAL SUPPORT (OUTPATIENT)
Dept: GYNECOLOGY | Facility: CLINIC | Age: 28
End: 2023-09-21

## 2023-09-21 DIAGNOSIS — Z30.9 ENCOUNTER FOR CONTRACEPTIVE MANAGEMENT, UNSPECIFIED TYPE: Primary | ICD-10-CM

## 2023-09-21 PROCEDURE — 96372 THER/PROPH/DIAG INJ SC/IM: CPT | Mod: PBBFAC

## 2023-09-21 PROCEDURE — 99211 OFF/OP EST MAY X REQ PHY/QHP: CPT | Mod: PBBFAC

## 2023-09-21 RX ORDER — MEDROXYPROGESTERONE ACETATE 150 MG/ML
150 INJECTION, SUSPENSION INTRAMUSCULAR
Status: COMPLETED | OUTPATIENT
Start: 2023-09-21 | End: 2023-09-21

## 2023-09-21 RX ADMIN — MEDROXYPROGESTERONE ACETATE 150 MG: 150 INJECTION, SUSPENSION INTRAMUSCULAR at 11:09

## 2023-09-21 NOTE — PROGRESS NOTES
Depo provera given in left hip, tolerated well.   Gwen [id number].  Gave her appt paper for IUD placement in Dec.  She verbalizes that she did speak to the Dr about this plan and agrees.

## 2023-09-25 ENCOUNTER — LAB VISIT (OUTPATIENT)
Dept: LAB | Facility: HOSPITAL | Age: 28
End: 2023-09-25
Attending: STUDENT IN AN ORGANIZED HEALTH CARE EDUCATION/TRAINING PROGRAM

## 2023-09-25 ENCOUNTER — TELEPHONE (OUTPATIENT)
Dept: GYNECOLOGY | Facility: CLINIC | Age: 28
End: 2023-09-25

## 2023-09-25 DIAGNOSIS — O00.102 MISCARRIAGE OF LEFT TUBAL ECTOPIC PREGNANCY: ICD-10-CM

## 2023-09-25 DIAGNOSIS — O03.9 MISCARRIAGE OF LEFT TUBAL ECTOPIC PREGNANCY: ICD-10-CM

## 2023-09-25 LAB — B-HCG FREE SERPL-ACNC: 968.43 MIU/ML

## 2023-09-25 PROCEDURE — 84702 CHORIONIC GONADOTROPIN TEST: CPT

## 2023-09-25 PROCEDURE — 36415 COLL VENOUS BLD VENIPUNCTURE: CPT

## 2023-09-25 NOTE — TELEPHONE ENCOUNTER
Discussed with patient appropriate drop in HCG. Patient will need to continue weekly quants. Made aware and and standing order in place.     HCG trend   2627 -MTX-> D4 3820 -> D7 2773  9/25: 968    Chinese interpretor ID: [id number]    Estefania Can MD  [facility] PGY-2  09/25/2023 12:17 PM

## 2023-10-02 ENCOUNTER — LAB VISIT (OUTPATIENT)
Dept: LAB | Facility: HOSPITAL | Age: 28
End: 2023-10-02
Attending: STUDENT IN AN ORGANIZED HEALTH CARE EDUCATION/TRAINING PROGRAM

## 2023-10-02 ENCOUNTER — TELEPHONE (OUTPATIENT)
Dept: GYNECOLOGY | Facility: CLINIC | Age: 28
End: 2023-10-02

## 2023-10-02 DIAGNOSIS — O03.9 MISCARRIAGE OF LEFT TUBAL ECTOPIC PREGNANCY: ICD-10-CM

## 2023-10-02 DIAGNOSIS — O00.102 MISCARRIAGE OF LEFT TUBAL ECTOPIC PREGNANCY: ICD-10-CM

## 2023-10-02 LAB — B-HCG FREE SERPL-ACNC: 138.22 MIU/ML

## 2023-10-02 PROCEDURE — 36415 COLL VENOUS BLD VENIPUNCTURE: CPT

## 2023-10-02 PROCEDURE — 84702 CHORIONIC GONADOTROPIN TEST: CPT

## 2023-10-02 NOTE — TELEPHONE ENCOUNTER
Called to inform beta downtrending now 138. Patient to continue with weekly betas until zero.     Michelle Starkey MD MPH   [facility], PGY-3

## 2023-10-09 ENCOUNTER — LAB VISIT (OUTPATIENT)
Dept: LAB | Facility: HOSPITAL | Age: 28
End: 2023-10-09
Attending: ORTHOPAEDIC SURGERY

## 2023-10-09 ENCOUNTER — TELEPHONE (OUTPATIENT)
Dept: GYNECOLOGY | Facility: CLINIC | Age: 28
End: 2023-10-09

## 2023-10-09 DIAGNOSIS — O00.102 MISCARRIAGE OF LEFT TUBAL ECTOPIC PREGNANCY: ICD-10-CM

## 2023-10-09 DIAGNOSIS — O03.9 MISCARRIAGE OF LEFT TUBAL ECTOPIC PREGNANCY: ICD-10-CM

## 2023-10-09 LAB — B-HCG FREE SERPL-ACNC: 26.98 MIU/ML

## 2023-10-09 PROCEDURE — 84702 CHORIONIC GONADOTROPIN TEST: CPT

## 2023-10-09 PROCEDURE — 36415 COLL VENOUS BLD VENIPUNCTURE: CPT

## 2023-10-09 NOTE — TELEPHONE ENCOUNTER
Called to inform beta downtrending now 26. Patient to continue with weekly betas until zero.     Marshallese interpretor ID: [id number]    Estefania Can MD  [facility] PGY-2  10/09/2023 4:03 PM

## 2023-10-16 ENCOUNTER — LAB VISIT (OUTPATIENT)
Dept: LAB | Facility: HOSPITAL | Age: 28
End: 2023-10-16
Attending: STUDENT IN AN ORGANIZED HEALTH CARE EDUCATION/TRAINING PROGRAM

## 2023-10-16 DIAGNOSIS — O03.9 MISCARRIAGE OF LEFT TUBAL ECTOPIC PREGNANCY: ICD-10-CM

## 2023-10-16 DIAGNOSIS — O00.102 MISCARRIAGE OF LEFT TUBAL ECTOPIC PREGNANCY: ICD-10-CM

## 2023-10-16 LAB — B-HCG FREE SERPL-ACNC: 4.5 MIU/ML

## 2023-10-16 PROCEDURE — 36415 COLL VENOUS BLD VENIPUNCTURE: CPT

## 2023-10-16 PROCEDURE — 84702 CHORIONIC GONADOTROPIN TEST: CPT

## 2023-10-17 ENCOUNTER — TELEPHONE (OUTPATIENT)
Dept: GYNECOLOGY | Facility: CLINIC | Age: 28
End: 2023-10-17

## 2023-10-18 NOTE — TELEPHONE ENCOUNTER
Called to inform beta now down trended to 4.5. Will stop weekly bHCG at this time. Results discussed with patient, patient aware of plan.     Swedish interpretor ID: [id number]     Estefania Can MD  [facility] PGY-2  10/17/2023 7:58 PM